# Patient Record
Sex: FEMALE | Race: WHITE
[De-identification: names, ages, dates, MRNs, and addresses within clinical notes are randomized per-mention and may not be internally consistent; named-entity substitution may affect disease eponyms.]

---

## 2020-10-26 ENCOUNTER — HOSPITAL ENCOUNTER (EMERGENCY)
Dept: HOSPITAL 62 - ER | Age: 40
Discharge: HOME | End: 2020-10-26
Payer: SELF-PAY

## 2020-10-26 VITALS — SYSTOLIC BLOOD PRESSURE: 177 MMHG | DIASTOLIC BLOOD PRESSURE: 120 MMHG

## 2020-10-26 DIAGNOSIS — L03.319: ICD-10-CM

## 2020-10-26 DIAGNOSIS — R59.1: ICD-10-CM

## 2020-10-26 DIAGNOSIS — I11.0: ICD-10-CM

## 2020-10-26 DIAGNOSIS — F17.200: ICD-10-CM

## 2020-10-26 DIAGNOSIS — L03.115: ICD-10-CM

## 2020-10-26 DIAGNOSIS — I50.9: ICD-10-CM

## 2020-10-26 DIAGNOSIS — J44.9: ICD-10-CM

## 2020-10-26 DIAGNOSIS — R21: ICD-10-CM

## 2020-10-26 DIAGNOSIS — Z88.8: ICD-10-CM

## 2020-10-26 DIAGNOSIS — L03.116: ICD-10-CM

## 2020-10-26 DIAGNOSIS — L03.211: ICD-10-CM

## 2020-10-26 DIAGNOSIS — A49.02: Primary | ICD-10-CM

## 2020-10-26 DIAGNOSIS — L03.114: ICD-10-CM

## 2020-10-26 DIAGNOSIS — L03.113: ICD-10-CM

## 2020-10-26 DIAGNOSIS — Z88.0: ICD-10-CM

## 2020-10-26 PROCEDURE — 99283 EMERGENCY DEPT VISIT LOW MDM: CPT

## 2020-10-26 NOTE — ER DOCUMENT REPORT
ED General





- General


Chief Complaint: Skin Sore(s)


Stated Complaint: SKIN SORES


Notes: 





Patient is a 39-year-old white female with a past medical history of MRSA who 

presents the emergency department a with a chief complaint of multiple rashes 

began 2 to 3 days ago.  States it started on the face of small red areas.  She 

states that they bust open and crust.  She states that they have spread to the 

torso and extremities.  She denies any itching.  States that some of them are 

painful but not all of them.  Admits to some regional lymphadenopathy as well.  

Denies any fever but admits to some chills and fatigue.


TRAVEL OUTSIDE OF THE [place] IN LAST 30 DAYS: No





- Related Data


Allergies/Adverse Reactions: 


                                        





amoxicillin [Amoxicillin] Allergy (Verified 10/26/20 18:34)


   


iodine [Iodine] Allergy (Verified 10/26/20 18:34)


   











Past Medical History





- Social History


Smoking Status: Current Every Day Smoker


Family History: Reviewed & Not Pertinent, Arthritis, Hyperlipidemia, 

Hypertension, Malignancy, Thyroid Disfunction





- Past Medical History


Cardiac Medical History: Reports: Hx Congestive Heart Failure - with pregnancy, 

Hx Hypertension - when pregnant


Pulmonary Medical History: Reports: Hx Bronchitis, Hx COPD


Endocrine Medical History: Denies: Hx Diabetes Mellitus Type 1, Hx Diabetes 

Mellitus Type 2


Musculoskeletal Medical History: Reports Hx Musculoskeletal Trauma


Skin Medical History: Reports Hx Cellulitis


Psychiatric Medical History: Reports: Hx Anxiety, Hx Attention Deficit 

Hyperactivity Disorder


Traumatic Medical History: Reports: Hx Fractures - finger


Past Surgical History: Reports: Hx  Section, Hx Dilation and Curettage -

 x3, Hx Gynecologic Surgery - multiple cervical bx





- Immunizations


Immunizations up to date: Yes


Hx Diphtheria, Pertussis, Tetanus Vaccination: Yes


Hx Pneumococcal Vaccination: 01





Review of Systems





- Review of Systems


Constitutional: denies: Fever


EENT: denies: Nose congestion


Cardiovascular: denies: Orthopnea


Respiratory: denies: Hemoptysis


Gastrointestinal: denies: Blood streaked bowels


Genitourinary: denies: Incontinence


Female Genitourinary: denies: Post menopausal


Musculoskeletal: denies: Muscle pain


Hematologic/Lymphatic: denies: Easy bleeding


Neurological/Psychological: denies: Gait changes





Physical Exam





- Vital signs


Vitals: 





                                        











Temp Pulse Resp BP Pulse Ox


 


 97.7 F   97   20   177/120 H  98 


 


 10/26/20 18:08  10/26/20 18:08  10/26/20 18:08  10/26/20 18:08  10/26/20 18:08














- General


General appearance: Appears well, Alert


In distress: None





- HEENT


Head: Normocephalic, Atraumatic


Eyes: Normal


Pupils: PERRL


Neck: Normal, Supple





- Respiratory


Respiratory status: No respiratory distress


Chest status: Nontender


Breath sounds: Normal


Chest palpation: Normal





- Cardiovascular


Rhythm: Regular


Heart sounds: Normal auscultation





- Neurological


Neuro grossly intact: Yes


Cognition: Normal


Orientation: AAOx4





- Psychological


Associated symptoms: Normal affect, Normal mood





- Skin


Skin Color: Other - Multiple dime size circumferential erythematous lesions 

about the face torso and extremities scattered diffusely.  No expanding 

cellulitis or abscess formations with fluid collections appreciated.  No 

proximal streaking.  Some regional lymphadenopathies are palpated.





Course





- Re-evaluation


Re-evalutation: 





10/26/20 18:48


History and physical consistent with MRSA.  Patient was started on Bactrim given

 erythromycin ointment for the nose.  Counseled her regarding the importance of 

outpatient follow-up and advised that she return here or any ER immediately with

 any new, persistent or worsening symptoms.  She verbalized understood and 

agreed.





- Vital Signs


Vital signs: 





                                        











Temp Pulse Resp BP Pulse Ox


 


 97.7 F   97   20   177/120 H  98 


 


 10/26/20 18:08  10/26/20 18:08  10/26/20 18:08  10/26/20 18:08  10/26/20 18:08














Discharge





- Discharge


Clinical Impression: 


 MRSA cellulitis





Condition: Stable


Disposition: HOME, SELF-CARE


Instructions:  Trimethoprim-Sulfa ([facility])


Additional Instructions: 


Follow-up with your regular doctor in 2 to 3 days for reevaluation.  Return here

 or any ER immediately with any new, persistent or worsening symptoms.


Prescriptions: 


Sulfamethoxazole/Trimethoprim [Bactrim Ds Tablet] 1 each PO BID #20 tablet


Erythromycin Base in Ethanol [Erythromycin 2% Gel] 30 gm TP BID #1 gel..gm.


Referrals: 


[facility] [NO LOCAL MD] - Follow up as needed

## 2020-12-23 ENCOUNTER — HOSPITAL ENCOUNTER (EMERGENCY)
Dept: HOSPITAL 62 - ER | Age: 40
Discharge: LEFT BEFORE DISCHARGE | End: 2020-12-23
Payer: SELF-PAY

## 2020-12-23 ENCOUNTER — HOSPITAL ENCOUNTER (EMERGENCY)
Dept: HOSPITAL 62 - ER | Age: 40
Discharge: HOME | End: 2020-12-23
Payer: SELF-PAY

## 2020-12-23 VITALS — SYSTOLIC BLOOD PRESSURE: 171 MMHG | DIASTOLIC BLOOD PRESSURE: 123 MMHG

## 2020-12-23 VITALS — DIASTOLIC BLOOD PRESSURE: 112 MMHG | SYSTOLIC BLOOD PRESSURE: 153 MMHG

## 2020-12-23 DIAGNOSIS — R42: ICD-10-CM

## 2020-12-23 DIAGNOSIS — I10: ICD-10-CM

## 2020-12-23 DIAGNOSIS — F41.9: Primary | ICD-10-CM

## 2020-12-23 DIAGNOSIS — Z53.21: Primary | ICD-10-CM

## 2020-12-23 DIAGNOSIS — R05: ICD-10-CM

## 2020-12-23 DIAGNOSIS — R11.0: ICD-10-CM

## 2020-12-23 DIAGNOSIS — Z56.0: ICD-10-CM

## 2020-12-23 DIAGNOSIS — R51.9: ICD-10-CM

## 2020-12-23 DIAGNOSIS — R07.89: ICD-10-CM

## 2020-12-23 DIAGNOSIS — F17.210: ICD-10-CM

## 2020-12-23 DIAGNOSIS — Z88.0: ICD-10-CM

## 2020-12-23 DIAGNOSIS — J44.9: ICD-10-CM

## 2020-12-23 LAB
ADD MANUAL DIFF: NO
ALBUMIN SERPL-MCNC: 4.3 G/DL (ref 3.5–5)
ALP SERPL-CCNC: 70 U/L (ref 38–126)
ANION GAP SERPL CALC-SCNC: 8 MMOL/L (ref 5–19)
AST SERPL-CCNC: 23 U/L (ref 14–36)
BASOPHILS # BLD AUTO: 0 10^3/UL (ref 0–0.2)
BASOPHILS NFR BLD AUTO: 0.5 % (ref 0–2)
BILIRUB DIRECT SERPL-MCNC: 0.1 MG/DL (ref 0–0.4)
BILIRUB SERPL-MCNC: 0.5 MG/DL (ref 0.2–1.3)
BUN SERPL-MCNC: 13 MG/DL (ref 7–20)
CALCIUM: 10.3 MG/DL (ref 8.4–10.2)
CHLORIDE SERPL-SCNC: 102 MMOL/L (ref 98–107)
CO2 SERPL-SCNC: 26 MMOL/L (ref 22–30)
EOSINOPHIL # BLD AUTO: 0.1 10^3/UL (ref 0–0.6)
EOSINOPHIL NFR BLD AUTO: 0.7 % (ref 0–6)
ERYTHROCYTE [DISTWIDTH] IN BLOOD BY AUTOMATED COUNT: 14.2 % (ref 11.5–14)
GLUCOSE SERPL-MCNC: 98 MG/DL (ref 75–110)
HCT VFR BLD CALC: 40.5 % (ref 36–47)
HGB BLD-MCNC: 14.2 G/DL (ref 12–15.5)
LYMPHOCYTES # BLD AUTO: 2.5 10^3/UL (ref 0.5–4.7)
LYMPHOCYTES NFR BLD AUTO: 31.6 % (ref 13–45)
MCH RBC QN AUTO: 29.2 PG (ref 27–33.4)
MCHC RBC AUTO-ENTMCNC: 34.9 G/DL (ref 32–36)
MCV RBC AUTO: 84 FL (ref 80–97)
MONOCYTES # BLD AUTO: 0.5 10^3/UL (ref 0.1–1.4)
MONOCYTES NFR BLD AUTO: 6.8 % (ref 3–13)
NEUTROPHILS # BLD AUTO: 4.7 10^3/UL (ref 1.7–8.2)
NEUTS SEG NFR BLD AUTO: 60.4 % (ref 42–78)
PLATELET # BLD: 234 10^3/UL (ref 150–450)
POTASSIUM SERPL-SCNC: 3.9 MMOL/L (ref 3.6–5)
PROT SERPL-MCNC: 7.8 G/DL (ref 6.3–8.2)
RBC # BLD AUTO: 4.84 10^6/UL (ref 3.72–5.28)
TOTAL CELLS COUNTED % (AUTO): 100 %
WBC # BLD AUTO: 7.8 10^3/UL (ref 4–10.5)

## 2020-12-23 PROCEDURE — 85025 COMPLETE CBC W/AUTO DIFF WBC: CPT

## 2020-12-23 PROCEDURE — 96374 THER/PROPH/DIAG INJ IV PUSH: CPT

## 2020-12-23 PROCEDURE — 83735 ASSAY OF MAGNESIUM: CPT

## 2020-12-23 PROCEDURE — 84484 ASSAY OF TROPONIN QUANT: CPT

## 2020-12-23 PROCEDURE — 99284 EMERGENCY DEPT VISIT MOD MDM: CPT

## 2020-12-23 PROCEDURE — 96361 HYDRATE IV INFUSION ADD-ON: CPT

## 2020-12-23 PROCEDURE — 93010 ELECTROCARDIOGRAM REPORT: CPT

## 2020-12-23 PROCEDURE — 71045 X-RAY EXAM CHEST 1 VIEW: CPT

## 2020-12-23 PROCEDURE — 36415 COLL VENOUS BLD VENIPUNCTURE: CPT

## 2020-12-23 PROCEDURE — 93005 ELECTROCARDIOGRAM TRACING: CPT

## 2020-12-23 PROCEDURE — 96375 TX/PRO/DX INJ NEW DRUG ADDON: CPT

## 2020-12-23 PROCEDURE — 80053 COMPREHEN METABOLIC PANEL: CPT

## 2020-12-23 SDOH — ECONOMIC STABILITY - INCOME SECURITY: UNEMPLOYMENT, UNSPECIFIED: Z56.0

## 2020-12-23 NOTE — RADIOLOGY REPORT (SQ)
EXAM DESCRIPTION:  CHEST SINGLE VIEW



IMAGES COMPLETED DATE/TIME:  12/23/2020 2:41 pm



REASON FOR STUDY:  dizzy, sob



COMPARISON:  3/24/2016



EXAM PARAMETERS:  NUMBER OF VIEWS: One view.

TECHNIQUE: Single frontal radiographic view of the chest acquired.

RADIATION DOSE: NA

LIMITATIONS: None.



FINDINGS:  LUNGS AND PLEURA: No opacities, masses or pneumothorax. No pleural effusion.

MEDIASTINUM AND HILAR STRUCTURES: No masses.  Contour normal.

HEART AND VASCULAR STRUCTURES: Heart normal in size.  Normal vasculature.

BONES: No acute findings.

HARDWARE: None in the chest.

OTHER: No other significant finding.



IMPRESSION:  NO ACUTE RADIOGRAPHIC FINDING IN THE CHEST.



TECHNICAL DOCUMENTATION:  JOB ID:  [id number]

 2011 [employer]- All Rights Reserved



Reading location - IP/workstation name: [email or web address]

## 2020-12-23 NOTE — ER DOCUMENT REPORT
ED Medical Screen (RME)





- General


Chief Complaint: High Blood Pressure


Stated Complaint: BLOOD PRESSURE CONCERNS,SHORTNESS OF BREATH


Time Seen by Provider: 20 13:18


Notes: 





Patient presents complaining of dizziness with shortness of breath that started 

last night.   Patient complains of pain to bilateral shoulders to the lateral 

sides of her neck and upper back area.  Patient reports elevated blood pressure 

reading.  Patient reports only underlying history of anxiety.





I have greeted and performed a rapid initial assessment of this patient.  A 

comprehensive ED assessment and evaluation of the patient, analysis of test 

results and completion of the medical decision making process will be conducted 

by additional ED providers.


TRAVEL OUTSIDE OF THE [place] IN LAST 30 DAYS: No





- Related Data


Allergies/Adverse Reactions: 


                                        





amoxicillin [Amoxicillin] Allergy (Verified 10/26/20 18:34)


   


iodine [Iodine] Allergy (Verified 10/26/20 18:34)


   











Past Medical History





- Past Medical History


Cardiac Medical History: Reports: Hx Congestive Heart Failure - with pregnancy, 

Hx Hypertension - when pregnant


Pulmonary Medical History: Reports: Hx Bronchitis, Hx COPD


Endocrine Medical History: Denies: Hx Diabetes Mellitus Type 1, Hx Diabetes 

Mellitus Type 2


Musculoskeltal Medical History: Reports Hx Musculoskeletal Trauma


Skin Medical History: Reports Hx Cellulitis


Psychiatric Medical History: Reports: Hx Anxiety, Hx Attention Deficit 

Hyperactivity Disorder


Traumatic Medical History: Reports: Hx Fractures - finger


Past Surgical History: Reports: Hx  Section, Hx Dilation and Curettage -

 x3, Hx Gynecologic Surgery - multiple cervical bx





- Immunizations


Immunizations up to date: Yes


Hx Diphtheria, Pertussis, Tetanus Vaccination: Yes





Physical Exam





- Vital signs


Vitals: 





                                        











Temp Pulse Resp BP Pulse Ox


 


 97.8 F   85   20   183/125 H  97 


 


 20 13:12  20 13:12  20 13:12  20 13:12  20 13:12














- Respiratory


Respiratory status: No respiratory distress


Chest status: Nontender


Breath sounds: Normal





- Cardiovascular


Rhythm: Regular


Heart sounds: S1 appreciated, S2 appreciated





Course





- Vital Signs


Vital signs: 





                                        











Temp Pulse Resp BP Pulse Ox


 


 97.8 F   85   20   183/125 H  97 


 


 20 13:12  20 13:12  20 13:12  20 13:12  20 13:12

## 2020-12-23 NOTE — ER DOCUMENT REPORT
ED General





- General


Chief Complaint: Dizziness


Stated Complaint: BLOOD PRESSURE CONCERNS,SHORTNESS OF BREATH


Time Seen by Provider: 20 13:18


Mode of Arrival: Ambulatory


Information source: Patient


Notes: 





40-year-old female with a history of GERD presents with multiple complaints 

including lightheadedness, chest tightness, anxiety and concerns for elevated 

blood pressure.  Patient states that she awoke at 4 AM with the symptoms.  She 

denies any new medications.  She does admit that she believes this is anxiety 

related as she has not been able to find a job and states that she is "running 

out of money".


TRAVEL OUTSIDE OF THE [place] IN LAST 30 DAYS: No





- HPI


Onset: Yesterday


Onset/Duration: Gradual


Quality of pain: Other - Tightness


Severity: Mild


Pain Level: 0


Associated symptoms: Chest pain, Nonproductive cough, Headache, Nausea, 

Shortness of breath.  denies: Fever, Hurts to breath, Leg swelling, Vomiting, 

Sinus pain/drainage, Sore throat, Sweating, Weakness


Exacerbated by: Movement


Relieved by: Denies


Similar symptoms previously: No


Recently seen / treated by doctor: No





- Related Data


Allergies/Adverse Reactions: 


                                        





amoxicillin [Amoxicillin] Allergy (Verified 20 13:55)


   


iodine [Iodine] Allergy (Verified 20 13:55)


   











Past Medical History





- General


Information source: Patient





- Social History


Smoking Status: Current Every Day Smoker


Cigarette use (# per day): Yes - 10


Chew tobacco use (# tins/day): No


Smoking Education Provided: Yes - Smoking cessation counseling was provided for 

4 minutes at the bedside 


Frequency of alcohol use: Heavy


Drug Abuse: None


Lives with: Family


Family History: Reviewed & Not Pertinent, Arthritis, Hyperlipidemia, 

Hypertension, Malignancy, Thyroid Disfunction


Patient has suicidal ideation: No


Patient has homicidal ideation: No





- Past Medical History


Cardiac Medical History: Reports: Hx Congestive Heart Failure - with pregnancy, 

Hx Hypertension - when pregnant


Pulmonary Medical History: Reports: Hx Bronchitis, Hx COPD


Endocrine Medical History: Denies: Hx Diabetes Mellitus Type 1, Hx Diabetes 

Mellitus Type 2


Musculoskeletal Medical History: Reports Hx Musculoskeletal Trauma


Skin Medical History: Reports Hx Cellulitis


Psychiatric Medical History: Reports: Hx Anxiety, Hx Attention Deficit Hyp

eractivity Disorder


Traumatic Medical History: Reports: Hx Fractures - finger


Past Surgical History: Reports: Hx  Section, Hx Dilation and Curettage -

 x3, Hx Gynecologic Surgery - multiple cervical bx





- Immunizations


Immunizations up to date: Yes


Hx Diphtheria, Pertussis, Tetanus Vaccination: Yes


Hx Pneumococcal Vaccination: 01





Review of Systems





- Review of Systems


Notes: 





REVIEW OF SYSTEMS:


CONSTITUTIONAL :  Denies fever,  chills, or sweats.  Denies recent illness. 

Denies weight loss, recent hospitalizations. 


EENT: Denies visual changes, eye pain.  Denies sore throat, oral lesions, 

difficulty swallowing.


CARDIOVASCULAR:  + chest pain.  Denies palpitations. Denies lower extremity 

edema.


RESPIRATORY: + cough. + shortness of breath, denies wheezing.


GASTROINTESTINAL:  Denies abdominal pain or distention.  Denies  vomiting, or 

diarrhea.  Denies blood in vomitus, stools, or per rectum.  Denies black, tarry 

stools.  Denies constipation.  


GENITOURINARY:  Denies difficulty urinating, painful urination,  frequency, 

blood in urine, or  vaginal discharge.


MUSCULOSKELETAL:  Denies back or neck pain or stiffness.  Denies joint pain or 

swelling.


SKIN:   + rash, lesions or sores.


HEMATOLOGIC :   Denies easy bruising or bleeding.


LYMPHATIC:  Denies swollen glands.


NEUROLOGICAL:  Denies confusion or altered mental status.  Denies loss of 

consciousness.  Denies dizziness .  + headache.  Denies weakness or paralysis.  

Denies problems difficulty with ambulation, slurred speech.  Denies sensory 

loss, numbness, or tingling.  Denies seizures.


PSYCHIATRIC:  Denies anxiety or stress.  Denies depression, suicidal ideation, 

or homicidal ideation.  Denies visual or auditory hallucinations.








Physical Exam





- Vital signs


Vitals: 


                                        











Temp Pulse Resp BP Pulse Ox


 


 97.8 F   85   20   183/125 H  97 


 


 20 13:12  20 13:12  20 13:12  20 13:12  20 13:12














- Notes


Notes: 





PHYSICAL EXAMINATION:





GENERAL: Well-appearing, well-nourished and in no acute distress.





HEAD: Atraumatic, normocephalic.





EYES: Pupils equal round and reactive to light, extraocular movements intact, 

conjunctiva are normal.





ENT: Nares patent, oropharynx clear without exudates.  Moist mucous membranes.





NECK: Normal range of motion, supple without lymphadenopathy





LUNGS: Breath sounds clear to auscultation bilaterally and equal.  No wheezes 

rales or rhonchi.





HEART: Regular rate and rhythm without murmurs





ABDOMEN: Soft, nontender, nondistended abdomen.  No guarding, no rebound.  No 

masses appreciated.





Female : deferred





Musculoskeletal: Normal range of motion, no pitting or edema.  No cyanosis.





NEUROLOGICAL: Cranial nerves grossly intact.  Normal speech, normal gait.  

Normal sensory, motor exams





PSYCH: Normal mood, normal affect.





SKIN: Warm, Dry, normal turgor, no rashes or lesions noted.





Course





- Re-evaluation


Re-evalutation: 





Laboratory











  20





  13:44 13:44 13:44


 


WBC  7.8  


 


RBC  4.84  


 


Hgb  14.2  


 


Hct  40.5  


 


MCV  84  


 


MCH  29.2  


 


MCHC  34.9  


 


RDW  14.2 H  


 


Plt Count  234  


 


Lymph % (Auto)  31.6  


 


Mono % (Auto)  6.8  


 


Eos % (Auto)  0.7  


 


Baso % (Auto)  0.5  


 


Absolute Neuts (auto)  4.7  


 


Absolute Lymphs (auto)  2.5  


 


Absolute Monos (auto)  0.5  


 


Absolute Eos (auto)  0.1  


 


Absolute Basos (auto)  0.0  


 


Seg Neutrophils %  60.4  


 


Sodium   135.7 L 


 


Potassium   3.9 


 


Chloride   102 


 


Carbon Dioxide   26 


 


Anion Gap   8 


 


BUN   13 


 


Creatinine   0.70 


 


Est GFR ( Amer)   > 60 


 


Est GFR (MDRD) Non-Af   > 60 


 


Glucose   98 


 


Calcium   10.3 H 


 


Magnesium   1.8 


 


Total Bilirubin   0.5 


 


Direct Bilirubin   0.1 


 


Neonat Total Bilirubin   Not Reportable 


 


Neonat Direct Bilirubin   Not Reportable 


 


Neonat Indirect Bili   Not Reportable 


 


AST   23 


 


ALT   19 


 


Alkaline Phosphatase   70 


 


Troponin I    < 0.012


 


Total Protein   7.8 


 


Albumin   4.3 











                                        





Chest X-Ray  20 13:23


IMPRESSION:  NO ACUTE RADIOGRAPHIC FINDING IN THE CHEST.


 











                                        











Temp Pulse Resp BP Pulse Ox


 


 97.8 F   85   18   160/112 H  100 


 


 20 13:12  20 13:12  20 15:01  20 15:01  20 15:01











20 15:37


On reevaluation patient reports feeling better.  She is interested in blood 

pressure medication as she has had several visits with elevated blood pressure. 

 No evidence of endorgan damage. amlodipine and vistaril prescribed.


20 15:51


Patient was evaluated and treated as appropriate for the patient's presenting sy

mptoms and complaint, with consideration of any critical or life threatening 

conditions that may be associated with their obtained history and exam as noted 

above. All results were discussed with patient . Patient provided the 

opportunity to ask questions, and express concerns. Patient was educated on 

treatments based on their presumed diagnosis as noted above.  At this time we 

will discharge the patient with return precautions and follow-up 

recommendations.  Verbal discharge instructions given a the bedside. Medication 

warnings reviewed. Patient is in agreement with this plan and has verbalized 

understanding of return precautions. 





After careful consideration I feel that that patient can be safely discharged 

from the emergency department,  they were advised to followup with a primary car

e physician in 2-3 days. 








Dictation on this chart was performed using voice recognition software and may 

result in unintended grammatical, spelling, syntax or errors.

















- Vital Signs


Vital signs: 


                                        











Temp Pulse Resp BP Pulse Ox


 


 97.8 F   85   18   160/112 H  100 


 


 20 13:12  20 13:12  20 15:01  20 15:01  20 15:01














- Laboratory Results


Result Diagrams: 


                                 20 13:44





                                 20 13:44


Laboratory Results Interpreted: 


                                        











  20





  13:44 13:44


 


RDW  14.2 H 


 


Sodium   135.7 L


 


Calcium   10.3 H











Critical Laboratory Results Reviewed: No Critical Results





- Radiology Results


Critical Radiology Results Reviewed: No Critical Results





- EKG Interpretation by Me


EKG shows normal: Sinus rhythm


Rate: Normal


Rhythm: NSR





Discharge





- Discharge


Clinical Impression: 


 Lightheaded, Anxiety





Hypertension


Qualifiers:


 Hypertension type: unspecified Qualified Code(s): I10 - Essential (primary) 

hypertension





Condition: Good


Disposition: HOME, SELF-CARE


Instructions:  High Blood Pressure ([facility]), Dizziness ([facility]), Anxiety ([facility])


Additional Instructions: 


Recommendations:


 


It is recommended to followup with a primary care doctor within the next 2 days.

  If you do not have a primary care doctor or you are unable to get an 

apointment during that time, I left the number for some internal medicine 

physicians that are affiliated with this [facility].


 


Dr[facility] 


[address], [place] [address] [phone]


 


Dr Bloom


Address: [address] , [place] [address] 


[phone]


 


Dr Wyman


Address: [address] , [place] [address] 


[phone]





Follow up with your [id number] hours  for further care or return to the ED

 IMMEDIATELY if symptoms worsen or you have any concerns.  If you cannot afford 

to follow up with your primary care physician a list of low cost clinics have 

been provided at the end of your discharge papers as well.











Most prescribed medications have multiple side effects.  The safest thing to do 

is when filling your prescription  speak to your pharmacist regarding possible 

interactions with your normal home medications and over the counter medications 

such as Ibuprofen, Tylenol, Benadryl.  If you experience any symptoms that cause

 you discomfort or concern you should discontinue the medication immediately and

 return to the emergency room or call your primary care physician.


 


Prescriptions: 


Amlodipine Besylate [Norvasc 10 mg Tablet] 10 mg PO DAILY #30 tablet


Hydroxyzine Pamoate [Vistaril 25 mg Capsule] 25 mg PO Q8H PRN #30 capsule


 PRN Reason: Anxiety


Forms:  Elevated Blood Pressure, Smoking Cessation Education

## 2020-12-23 NOTE — EKG REPORT
SEVERITY:- BORDERLINE ECG -

SINUS RHYTHM

PROBABLE LEFT ATRIAL ABNORMALITY

:

Confirmed by: Godwin Solis MD 23-Dec-2020 15:17:48